# Patient Record
(demographics unavailable — no encounter records)

---

## 2025-02-20 NOTE — HISTORY OF PRESENT ILLNESS
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  She is doing well with regards to the right elbow as well as the right hip she continues to be painful with regards to the left hip.  Motion of the left hip is difficult because of pain and spasm and interferes with perineal care.

## 2025-02-20 NOTE — ASSESSMENT
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  This child has been indicated for left femoral head resection with interposition arthroplasty.  This will be scheduled with  Jesus.

## 2025-02-20 NOTE — PHYSICAL EXAM
[FreeTextEntry1] : Exam today reveals he has good passive motion to the right elbow without any pain specifically on rotation.  No tenderness to palpation of the elbow in general with no obvious instability status post radial head resection.  The right hip moves well without tenderness.  The left hip is frankly dislocated and is painful on motion.  X-ray of the pelvis revealed no significant interval change with regards to the significant superior lateral instability of the left hip.  The right hip mild dysplasia present though the hip is located.

## 2025-07-25 NOTE — DATA REVIEWED
[de-identified] : AP the pelvis on 7/24/2025 reveals satisfactory position of the left hip which has undergone proximal femoral resection interposition arthroplasty.

## 2025-07-25 NOTE — PHYSICAL EXAM
[FreeTextEntry1] : On physical examination the wound is healing well and it is clean.  The casts are intact.  Neurovascular status of the exposed toes is intact.

## 2025-07-25 NOTE — HISTORY OF PRESENT ILLNESS
[FreeTextEntry1] : This 12-year-old child is now 1 week status post proximal femoral resection interposition arthroplasty for spastic left hip dislocation.  Child is being maintained in bilateral long-leg cast with an abduction bar.  The child appears comfortable.

## 2025-07-25 NOTE — ASSESSMENT
[FreeTextEntry1] : Cerebral palsy (spastic quadriplegic type) Status post proximal femoral resection interposition arthroplasty for left spastic hip dislocation  The patient will return in 3 to 4 weeks at which time the cast will be removed.  A brace may be recommended at that time.